# Patient Record
Sex: FEMALE | Race: WHITE
[De-identification: names, ages, dates, MRNs, and addresses within clinical notes are randomized per-mention and may not be internally consistent; named-entity substitution may affect disease eponyms.]

---

## 2023-05-07 ENCOUNTER — HOSPITAL ENCOUNTER (EMERGENCY)
Dept: HOSPITAL 26 - MED | Age: 68
Discharge: HOME | End: 2023-05-07
Payer: COMMERCIAL

## 2023-05-07 VITALS — HEIGHT: 62 IN | BODY MASS INDEX: 35.15 KG/M2 | WEIGHT: 191 LBS

## 2023-05-07 VITALS — DIASTOLIC BLOOD PRESSURE: 72 MMHG | SYSTOLIC BLOOD PRESSURE: 132 MMHG

## 2023-05-07 VITALS — DIASTOLIC BLOOD PRESSURE: 78 MMHG | SYSTOLIC BLOOD PRESSURE: 147 MMHG
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
## 2023-05-07 DIAGNOSIS — S80.02XA: ICD-10-CM
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
## 2023-05-07 DIAGNOSIS — S60.222A: Primary | ICD-10-CM

## 2023-05-07 DIAGNOSIS — S09.90XA: ICD-10-CM

## 2023-05-07 PROCEDURE — 73562 X-RAY EXAM OF KNEE 3: CPT

## 2023-05-07 PROCEDURE — 29125 APPL SHORT ARM SPLINT STATIC: CPT

## 2023-05-07 PROCEDURE — 72125 CT NECK SPINE W/O DYE: CPT

## 2023-05-07 PROCEDURE — 73130 X-RAY EXAM OF HAND: CPT

## 2023-05-07 PROCEDURE — 70450 CT HEAD/BRAIN W/O DYE: CPT

## 2023-05-07 PROCEDURE — 99284 EMERGENCY DEPT VISIT MOD MDM: CPT

## 2023-05-07 NOTE — NUR
Patient discharged with v/s stable. Written and verbal after care instructions 
given and explained. 

Patient verbalized understanding. Ambulatory with steady gait. All questions 
addressed prior to discharge. Advised to follow up with PMD.

splint in place, n/c intact, arm on sling